# Patient Record
Sex: FEMALE | URBAN - METROPOLITAN AREA
[De-identification: names, ages, dates, MRNs, and addresses within clinical notes are randomized per-mention and may not be internally consistent; named-entity substitution may affect disease eponyms.]

---

## 2024-11-11 ENCOUNTER — OFFICE VISIT (OUTPATIENT)
Dept: URGENT CARE | Age: 74
End: 2024-11-11
Payer: MEDICARE

## 2024-11-11 VITALS
DIASTOLIC BLOOD PRESSURE: 73 MMHG | OXYGEN SATURATION: 97 % | WEIGHT: 122 LBS | HEIGHT: 62 IN | HEART RATE: 68 BPM | BODY MASS INDEX: 22.45 KG/M2 | SYSTOLIC BLOOD PRESSURE: 148 MMHG | RESPIRATION RATE: 18 BRPM

## 2024-11-11 DIAGNOSIS — S61.011A LACERATION OF RIGHT THUMB WITHOUT FOREIGN BODY, NAIL DAMAGE STATUS UNSPECIFIED, INITIAL ENCOUNTER: Primary | ICD-10-CM

## 2024-11-11 PROCEDURE — 1159F MED LIST DOCD IN RCRD: CPT | Performed by: FAMILY MEDICINE

## 2024-11-11 PROCEDURE — 90714 TD VACC NO PRESV 7 YRS+ IM: CPT | Performed by: FAMILY MEDICINE

## 2024-11-11 PROCEDURE — 99203 OFFICE O/P NEW LOW 30 MIN: CPT | Performed by: FAMILY MEDICINE

## 2024-11-11 PROCEDURE — 1125F AMNT PAIN NOTED PAIN PRSNT: CPT | Performed by: FAMILY MEDICINE

## 2024-11-11 PROCEDURE — 1036F TOBACCO NON-USER: CPT | Performed by: FAMILY MEDICINE

## 2024-11-11 PROCEDURE — 12001 RPR S/N/AX/GEN/TRNK 2.5CM/<: CPT | Performed by: FAMILY MEDICINE

## 2024-11-11 PROCEDURE — 90471 IMMUNIZATION ADMIN: CPT | Performed by: FAMILY MEDICINE

## 2024-11-11 ASSESSMENT — PAIN SCALES - GENERAL: PAINLEVEL_OUTOF10: 10-WORST PAIN EVER

## 2024-11-11 NOTE — PATIENT INSTRUCTIONS
Dermabond glue was applied today to the laceration. This will fall off in about 5-7 days. Avoid any prolonged submersion in water. You may bathe normally wash your hands as usual, but pat dry after and avoid scrubbing the area.    After the glue has fallen off, apply vitamin E ointment to help reduce scarring.    Leave the bandage that was applied here today for the next 24 hours.    If you notice any redness, pus drainage, worsening pain near the site, follow-up sooner.    Contact PCP regarding tetanus status as it was not found in on medical records system.

## 2024-11-11 NOTE — PROGRESS NOTES
"Subjective   Patient ID: Reba Buenrostro is a 74 y.o. female. They present today with a chief complaint of Injury (Right thumb laceration. Cut on a mandolin).    Patient disposition: Home    History of Present Illness    Injury    Right thumb laceration 1 hour ago while using a brand-new mandolin slicer.  Sliced the tip of the pad of her right thumb.  No numbness or tingling.  Able to move it well.  Unknown tetanus.  Patient will call PCP today.  Not currently on any blood thinners.  No other complaints      Past Medical History  Allergies as of 11/11/2024 - Reviewed 11/11/2024   Allergen Reaction Noted    Amoxicillin-pot clavulanate Other 01/31/2017    Azithromycin Other 10/18/2010       (Not in a hospital admission)            reports that she has never smoked. She has never used smokeless tobacco.    Review of Systems  As noted in HPI. ROS otherwise negative unless noted.       Objective    Vitals:    11/11/24 1354   BP: 148/73   BP Location: Left arm   Patient Position: Sitting   BP Cuff Size: Adult   Pulse: 68   Resp: 18   TempSrc: Oral   SpO2: 97%   Weight: 55.3 kg (122 lb)   Height: 1.575 m (5' 2\")     No LMP recorded. Patient is postmenopausal.    Physical Exam  Constitutional: vital signs reviewed. Well developed, well nourished. patient alert and patient without distress.   Psych: Normal mood and affect  Skin: Normal skin color and pigmentation, normal skin turgor, and no rash.  Right tip of first finger pad with 1 cm round flap laceration, about 90% avulsed with small stalk.  Slightly pink and viable.  Neurovascular intact.  No tendon deficit.  Cardiovascular: No edema in the extremities. Normal skin color/perfusion.   Pulmonary: Skin without cyanosis. Patient without respiratory distress. Speaking in full sentences.  Musculoskeletal: Normal gait and stance, balance and coordination without gross deficit.        Laceration Repair    Date/Time: 11/11/2024 2:21 PM    Performed by: Patricia Belle, "   Authorized by: Patricia Belle DO    Consent:     Consent obtained:  Verbal    Risks discussed:  Infection and pain  Universal protocol:     Patient identity confirmed:  Verbally with patient  Anesthesia:     Anesthesia method:  None  Laceration details:     Location:  Finger    Length (cm):  1    Depth (mm):  2  Exploration:     Contaminated: no    Treatment:     Area cleansed with:  Saline and chlorhexidine    Amount of cleaning:  Standard    Debridement:  None    Undermining:  None  Skin repair:     Repair method:  Tissue adhesive  Approximation:     Approximation:  Close  Repair type:     Repair type:  Simple      Point of Care Test & Imaging Results from this visit  No results found for this or any previous visit.         Diagnostic study results (if any) were reviewed.    Assessment/Plan   Allergies, medications, history, and pertinent labs/EKGs/Imaging reviewed.    Medical Decision Making  See note    Orders and Diagnoses  There are no diagnoses linked to this encounter.    Medical Admin Record      Follow Up Instructions  No follow-ups on file.      Electronically signed by [facility]

## 2025-07-15 ENCOUNTER — APPOINTMENT (OUTPATIENT)
Dept: ORTHOPEDIC SURGERY | Facility: CLINIC | Age: 75
End: 2025-07-15
Payer: MEDICARE

## 2025-07-15 ENCOUNTER — HOSPITAL ENCOUNTER (OUTPATIENT)
Dept: RADIOLOGY | Facility: EXTERNAL LOCATION | Age: 75
Discharge: HOME | End: 2025-07-15

## 2025-07-15 VITALS — WEIGHT: 123 LBS | BODY MASS INDEX: 21.79 KG/M2 | HEIGHT: 63 IN

## 2025-07-15 DIAGNOSIS — M76.892 HAMSTRING TENDINITIS OF LEFT THIGH: Primary | ICD-10-CM

## 2025-07-15 DIAGNOSIS — M79.652 LEFT THIGH PAIN: ICD-10-CM

## 2025-07-15 PROCEDURE — 20611 DRAIN/INJ JOINT/BURSA W/US: CPT | Performed by: FAMILY MEDICINE

## 2025-07-15 PROCEDURE — 1159F MED LIST DOCD IN RCRD: CPT | Performed by: FAMILY MEDICINE

## 2025-07-15 PROCEDURE — 1036F TOBACCO NON-USER: CPT | Performed by: FAMILY MEDICINE

## 2025-07-15 PROCEDURE — 99203 OFFICE O/P NEW LOW 30 MIN: CPT | Performed by: FAMILY MEDICINE

## 2025-07-15 PROCEDURE — 1160F RVW MEDS BY RX/DR IN RCRD: CPT | Performed by: FAMILY MEDICINE

## 2025-07-15 RX ORDER — LIDOCAINE HYDROCHLORIDE 20 MG/ML
2 INJECTION, SOLUTION INFILTRATION; PERINEURAL
Status: COMPLETED | OUTPATIENT
Start: 2025-07-15 | End: 2025-07-15

## 2025-07-15 RX ORDER — TRIAMCINOLONE ACETONIDE 40 MG/ML
40 INJECTION, SUSPENSION INTRA-ARTICULAR; INTRAMUSCULAR
Status: COMPLETED | OUTPATIENT
Start: 2025-07-15 | End: 2025-07-15

## 2025-07-15 RX ADMIN — LIDOCAINE HYDROCHLORIDE 2 ML: 20 INJECTION, SOLUTION INFILTRATION; PERINEURAL at 16:45

## 2025-07-15 RX ADMIN — TRIAMCINOLONE ACETONIDE 40 MG: 40 INJECTION, SUSPENSION INTRA-ARTICULAR; INTRAMUSCULAR at 16:45

## 2025-07-15 NOTE — PROGRESS NOTES
History of Present Illness   Chief Complaint   Patient presents with    Left Thigh - Pain     Nki  Pain with tightness and tenderness back of thigh   -groin +numbness        The patient is 74 y.o. female  here with a complaint of left posterior hip/gluteal pain with some radiation into the back of her thigh.  Atraumatic onset of symptoms over the past 6 months.  She says pain is relatively mild with normal day-to-day activity, has noticed more consistently when she is sitting on hard surfaces, says they drove to [place] recently and felt very uncomfortable, had to stop to get a pad to sit on in the car.  They are driving to [place] in a few weeks and is concerned about the drive there secondary to the pain she is having.  She admits to some numbness and tingling into her feet but feels like it may be unrelated to her hip pain.  She is seeing neurology for this in a few months.  She denies any lower extremity weakness.  She is taking over-the-counter medication sparingly for pain.  She denies any anterior groin pain, no lateral hip pain.    Medical History[1]    Medication Documentation Review Audit       Reviewed by Colt Luna (Technologist) on 11/11/24 at 1356      Medication Order Taking? Sig Documenting Provider Last Dose Status            No Medications to Display                                   RX Allergies[2]    Social History     Socioeconomic History    Marital status:      Spouse name: Not on file    Number of children: Not on file    Years of education: Not on file    Highest education level: Not on file   Occupational History    Not on file   Tobacco Use    Smoking status: Never    Smokeless tobacco: Never   Substance and Sexual Activity    Alcohol use: Not Currently     Comment: I have a glass of wine or a drink a few times a month when out with friends    Drug use: Never    Sexual activity: Not on file   Other Topics Concern    Not on file   Social History Narrative    Not on file      Social Drivers of Health     Financial Resource Strain: Not on file   Food Insecurity: Not on file   Transportation Needs: Not on file   Physical Activity: Not on file   Stress: Not on file   Social Connections: Not on file   Intimate Partner Violence: Not on file   Housing Stability: Not on file       Surgical History[3]       Review of Systems   GENERAL: Negative  GI: Negative  MUSCULOSKELETAL: See HPI  SKIN: Negative  NEURO:  Negative     Physical Exam:    General/Constitutional: well appearing, no distress, appears stated age  HEENT: sclera clear  Respiratory: non labored breathing  Vascular: No edema, swelling or tenderness, except as noted in detailed exam.  Integumentary: No impressive skin lesions present, except as noted in detailed exam.  Neurological:  Alert and oriented   Psychological:  Normal mood and affect.  Musculoskeletal: Normal, except as noted in detailed exam and in HPI.  Normal gait, unassisted    Left hip: Normal appearance, no rotational deformity or leg length discrepancy.  There is some tenderness to palpation in the posterior hip at the proximal hamstring/ischial tuberosity as well as more proximally near the piriformis region, she has no tenderness at the greater trochanter laterally over the anterior hip, no SI joint tenderness posteriorly.  She has good range of motion with flexion, internal and external rotation.  No motor deficits present at the hip.  There is some gluteal pain with resisted hip extension and knee flexion, no weakness is present.       Imaging: No imaging today      L Inj/Asp: L ischiogluteal bursa on 7/15/2025 4:45 PM  Indications: pain  Details: 22 G needle, ultrasound-guided lateral approach  Medications: 40 mg triamcinolone acetonide 40 mg/mL; 2 mL lidocaine 20 mg/mL (2 %)  Outcome: tolerated well, no immediate complications    Left hip ischiogluteal bursa and surrounding structures were appropriately visualized before and during injection    Ultrasound  images were permanently uploaded to the medical record/PACS  Procedure, treatment alternatives, risks and benefits explained, specific risks discussed. Consent was given by the patient. Immediately prior to procedure a time out was called to verify the correct patient, procedure, equipment, support staff and site/side marked as required. Patient was prepped and draped in the usual sterile fashion.               Assessment   1. Hamstring tendinitis of left thigh  Referral to Physical Therapy      2. Left thigh pain  Point of Care Ultrasound    Referral to Physical Therapy            Plan: Left posterior hip/thigh pain, differential includes proximal hamstring tendinopathy, may have some component of piriformis syndrome, we discussed possibility for sciatic symptoms though seems less likely based on exam findings today, description of pain.  Discussed further workup and treatment.  Did place referral for physical therapy, we did also proceed with ultrasound-guided proximal hamstring/ischial gluteal bursa injection with Kenalog today for both diagnostic and therapeutic purposes.  Patient will plan to follow-up as symptoms dictate.         [1]   Past Medical History:  Diagnosis Date    Arthritis 2000    Bunion     Cancer (Multi) Skin cancer 1990    Dupuytren's contracture 2015    Lumbosacral disc disease 2023    Spondylolisthesis 2023   [2]   Allergies  Allergen Reactions    Amoxicillin-Pot Clavulanate Other     Other Reaction(s): Intolerance      diarrhea    Azithromycin Other     Other Reaction(s): Contraindication-Medical Surgical   [3]   Past Surgical History:  Procedure Laterality Date    BUNIONECTOMY  1985

## 2025-07-21 ENCOUNTER — EVALUATION (OUTPATIENT)
Dept: PHYSICAL THERAPY | Facility: CLINIC | Age: 75
End: 2025-07-21
Payer: MEDICARE

## 2025-07-21 DIAGNOSIS — M25.552 PAIN OF LEFT HIP: ICD-10-CM

## 2025-07-21 DIAGNOSIS — S76.312D STRAIN OF LEFT HAMSTRING MUSCLE, SUBSEQUENT ENCOUNTER: Primary | ICD-10-CM

## 2025-07-21 PROCEDURE — 97535 SELF CARE MNGMENT TRAINING: CPT | Mod: GP | Performed by: PHYSICAL THERAPIST

## 2025-07-21 PROCEDURE — 97161 PT EVAL LOW COMPLEX 20 MIN: CPT | Mod: GP | Performed by: PHYSICAL THERAPIST

## 2025-07-21 PROCEDURE — 97110 THERAPEUTIC EXERCISES: CPT | Mod: GP | Performed by: PHYSICAL THERAPIST

## 2025-07-21 ASSESSMENT — ENCOUNTER SYMPTOMS
OCCASIONAL FEELINGS OF UNSTEADINESS: 0
DEPRESSION: 0

## 2025-07-21 NOTE — PROGRESS NOTES
Initial evaluation  Physical Therapy Initial Evaluation    Patient Name:Reba Buenrostro  MRN:[id number]  Today's Date:7/22/2025  Referred by: Eliazar Montero  Time Calculation  Start Time: 0245  Stop Time: 0339  Time Calculation (min): 54 min    Therapy Diagnosis  1. Strain of left hamstring muscle, subsequent encounter    2. Pain of left hip         Plan of Care  Planned interventions include PRN: therapeutic exercise, manual therapy, gait training, electrical stimulation, hot pack, HEP training.   Frequency and duration: 1 time(s) a week, for  6-8 weeks or 8 visits .   Plan of care was developed with input and agreement by the patient.   Plan for next session:  follow up after patient vacation to review/progress HEP - redo LEFS next session    Assessment  Problem List: Pain, range of motion/joint mobility, strength, activity limitations, ADLs/IADLs/self care skills, decreased functional level, decreased knowledge of HEP, flexibility, and gait/locomotion.    Patient is a 74 y.o. female who presents with complaint of chronic L hip pain . Standardized testing and measures administered today reveal that the patient has multiple impairments in body structures and functions, activity limitations, and participation restrictions. These include subjective and objective findings such as pain, tenderness to palpation of the affected area, decreased ROM, strength, flexibility, and function. The patient's impairments are likely influenced by mechanical dysfunction and deconditioning with possible overuse and degenerative changes. Skilled PT services are warranted in order to realize measurable and meaningful change in the above outcome measures and achieve improvements in the patient's functional status and individual goals.    Rehab Potential:good  Clinical Presentation: Stable and/or uncomplicated characteristics.   Evaluation Complexity: Low     Precautions/Fall Risk:none* Pacemaker no  Seizures No  Post Op Movement/Restrictions  No    Insurance  Visit number: 1   Approved number of visits: MN  Onset Date: 7/15/2025  Certification Period:  Beginnin2025            Ending: 10/19/2025  Payor: MEDICARE / Plan: MEDICARE PART A AND B / Product Type: *No Product type* /     Subjective  Chief complaint/reason for visit: Patient is a 74 year old female with chronic L hip pain. Recent cortisone injection with relief.  Mechanism of Injury:  a chronic condition and a progressive, insidious condition  Location of Pain: L hip (IT HS origin)   Current Pain Level (0-10): 1   High Pain Level (0-10): 7   Low Pain Level (0-10): 1  Pain Quality: sharp and tightness  Pain Exacerbating Factors: lying down, sitting, walking, overuse  Pain Relieving Factors: cortisone injections and massage roller,     Medical Screening: Reviewed medical history form with patient and medical screening assessed.   Red Flags: Do you have any of the following? No  Fever/chills, unexplained weight changes, dizziness/fainting, unexplained change in bowel or bladder functions, unexplained malaise or muscle weakness, night pain/sweats, numbness or tingling  Current Medical Management: Ortho, Injection,   Prior Level of Function (PLOF)  Patient previously independent with all ADLs  Exercise/Physical Activity: Walking/  Functional limitations: decreased positional tolerances to standing, sitting, walking, bending, driving, stairs, self-care activities, work related tasks, participation in home management/duties, participation in leisure activities and athletics.  Work Status: retired  Current Status: improved  Patient Awareness: Patient is aware of  her diagnosis and prognosis.   Living Environment: house  Social Support: spouse / significant other  Personal Factors That May Impact Care: none  Patient's Goal for Treatment: relieving pain, increasing strength, increasing mobility, improving positional tolerances, walking with a normal gait, reducing symptoms, returning to work,   and resuming athletics/activities .     Objective        Hip Objective  Observation/Posture: hallux valgus bilaterally  Palpation: mild TTP L glute med/SIJ and IT  Gait: WNL today (can be antalgic)  R/LFlexibility: minimal hip ER tightness, mild hip flexor/quad tightness  Lumbar Screen:  WNL  AROM (tested in supine):  R/L hip flexion AROM (degrees): WNL // WNL  R/L hip abduction AROM (degrees): WNL  //WNL  R/L hip extension AROM (degrees): 10 deg  R/L hip ER AROM (degrees):  WFL   R/L hip IR AROM (degrees): WFL  R/L knee flexion AROM (degrees): WNL  R/L knee extension AROM (degrees): WNL  MMT:   R/L hip flexion strength (MMT): 4+  R/L hip abduction strength (MMT): 4+  R/L hip adduction strength (MMT): 4+  R/L hip extension strength (MMT): 4  R/L hip IR strength (MMT): 4+  R/L hip ER strength (MMT): 4+    Single leg stance time (seconds): ~10 sec equal bilaterally  Functional Squat: Good  Step up/Stairs: reciprocal   Special Tests: (+) J Luis, (-) SLR, (-) neural tension    Treatment Performed Today: Initial evaluation and patient education regarding diagnosis, prognosis, contributing factors, comorbidities, importance and instruction of HEP, role of PT, postural re-education, activity modification, appropriate shoe wear    Therapeutic Exercise 15 minutes  Education/Resources provided today: Home Program   [facility]: Provided, reviewed, and performed the following therapeutic exercises with the patient:   Access Code: [email or web address]  URL: [email or web address]  Date: 07/21/2025  Prepared by: ML    Exercises  - Seated Table Hamstring Stretch  - 1 x daily - 7 x weekly - 3 sets - 30 hold  - Seated Piriformis Stretch with Trunk Bend  - 1 x daily - 7 x weekly - 3 sets - 30 hold  - Long Sitting Hamstring Set  - 1 x daily - 7 x weekly - 2 sets - 10 reps - 3 hold  - Supine Sciatic Nerve Glide  - 1 x daily - 7 x weekly - 2 sets - 10 reps    Modalities   Electrical Stimulation          minutes    Response to  Treatment: improved knowledge and understanding of condition      Goals: Goals set and discussed today.   Lower Extremity Goals  Lower Extremity Goals: By discharge, patient will:  1) Demonstrate independence with home exercise program for overall symptom management  2) Increase overall exercise tolerance without adverse reaction or increased chief complaint  3) Increase ROM of the  bilateral LE to WNL and pain free in order to improve the ability to perform essential ADLs  4) Increase strength of the bilateral LE to WNL and pain free in order to improve the ability to perform essential ADLs  5) Report decrease in pain by >= 2 points to meet MCID  6) Increase score of LEFS by > 9 points to meet the MCID  7) Ascend and descend 1 flight stairs reciprocally and safely without an increase in symptoms  8) Ambulate x community distances without an increase in symptoms  9) Be able to perform the ADL of sitting/traveling >2 hours without an increase or production of symptoms    Patient stated goal: reduce pain

## 2025-08-27 ENCOUNTER — TREATMENT (OUTPATIENT)
Dept: PHYSICAL THERAPY | Facility: CLINIC | Age: 75
End: 2025-08-27
Payer: MEDICARE

## 2025-08-27 DIAGNOSIS — M25.552 PAIN OF LEFT HIP: ICD-10-CM

## 2025-08-27 DIAGNOSIS — S76.312D STRAIN OF LEFT HAMSTRING MUSCLE, SUBSEQUENT ENCOUNTER: ICD-10-CM

## 2025-08-27 PROCEDURE — 97535 SELF CARE MNGMENT TRAINING: CPT | Mod: GP | Performed by: PHYSICAL THERAPIST

## 2025-08-27 PROCEDURE — 97110 THERAPEUTIC EXERCISES: CPT | Mod: GP | Performed by: PHYSICAL THERAPIST
